# Patient Record
Sex: FEMALE | Race: OTHER | HISPANIC OR LATINO | URBAN - METROPOLITAN AREA
[De-identification: names, ages, dates, MRNs, and addresses within clinical notes are randomized per-mention and may not be internally consistent; named-entity substitution may affect disease eponyms.]

---

## 2019-11-10 ENCOUNTER — EMERGENCY (EMERGENCY)
Facility: HOSPITAL | Age: 69
LOS: 1 days | Discharge: HOME | End: 2019-11-10
Attending: EMERGENCY MEDICINE
Payer: SELF-PAY

## 2019-11-10 VITALS
RESPIRATION RATE: 20 BRPM | SYSTOLIC BLOOD PRESSURE: 132 MMHG | DIASTOLIC BLOOD PRESSURE: 62 MMHG | OXYGEN SATURATION: 100 % | HEART RATE: 66 BPM

## 2019-11-10 VITALS
HEART RATE: 71 BPM | SYSTOLIC BLOOD PRESSURE: 172 MMHG | TEMPERATURE: 98 F | HEIGHT: 63 IN | RESPIRATION RATE: 20 BRPM | WEIGHT: 139.99 LBS | DIASTOLIC BLOOD PRESSURE: 81 MMHG | OXYGEN SATURATION: 100 %

## 2019-11-10 PROCEDURE — 70450 CT HEAD/BRAIN W/O DYE: CPT

## 2019-11-10 PROCEDURE — 70450 CT HEAD/BRAIN W/O DYE: CPT | Mod: 26

## 2019-11-10 PROCEDURE — 90715 TDAP VACCINE 7 YRS/> IM: CPT

## 2019-11-10 PROCEDURE — 99284 EMERGENCY DEPT VISIT MOD MDM: CPT | Mod: 25

## 2019-11-10 PROCEDURE — 71046 X-RAY EXAM CHEST 2 VIEWS: CPT | Mod: 26

## 2019-11-10 PROCEDURE — 72170 X-RAY EXAM OF PELVIS: CPT | Mod: 26

## 2019-11-10 PROCEDURE — 99284 EMERGENCY DEPT VISIT MOD MDM: CPT

## 2019-11-10 PROCEDURE — 72125 CT NECK SPINE W/O DYE: CPT

## 2019-11-10 PROCEDURE — 71046 X-RAY EXAM CHEST 2 VIEWS: CPT

## 2019-11-10 PROCEDURE — 72125 CT NECK SPINE W/O DYE: CPT | Mod: 26

## 2019-11-10 PROCEDURE — 72170 X-RAY EXAM OF PELVIS: CPT

## 2019-11-10 PROCEDURE — 90471 IMMUNIZATION ADMIN: CPT

## 2019-11-10 RX ORDER — ACETAMINOPHEN 500 MG
975 TABLET ORAL ONCE
Refills: 0 | Status: COMPLETED | OUTPATIENT
Start: 2019-11-10 | End: 2019-11-10

## 2019-11-10 RX ORDER — TETANUS TOXOID, REDUCED DIPHTHERIA TOXOID AND ACELLULAR PERTUSSIS VACCINE, ADSORBED 5; 2.5; 8; 8; 2.5 [IU]/.5ML; [IU]/.5ML; UG/.5ML; UG/.5ML; UG/.5ML
0.5 SUSPENSION INTRAMUSCULAR ONCE
Refills: 0 | Status: COMPLETED | OUTPATIENT
Start: 2019-11-10 | End: 2019-11-10

## 2019-11-10 RX ADMIN — TETANUS TOXOID, REDUCED DIPHTHERIA TOXOID AND ACELLULAR PERTUSSIS VACCINE, ADSORBED 0.5 MILLILITER(S): 5; 2.5; 8; 8; 2.5 SUSPENSION INTRAMUSCULAR at 17:20

## 2019-11-10 RX ADMIN — Medication 975 MILLIGRAM(S): at 17:19

## 2019-11-10 NOTE — ED PROVIDER NOTE - PHYSICAL EXAMINATION
Abrasion to right occipital scalp.  No midline cervical tenderness, no spinal TTP.  Right sided lumbar TTP.

## 2019-11-10 NOTE — ED PROVIDER NOTE - CLINICAL SUMMARY MEDICAL DECISION MAKING FREE TEXT BOX
69 y/o female presents s/p witnessed mechanical fall. No blood thinner use. Will check CT head and C-spine, CXR, X-ray of pelvis, provide pain control, update tetanus, and likely discharge.

## 2019-11-10 NOTE — ED ADULT TRIAGE NOTE - CHIEF COMPLAINT QUOTE
Head injury s/p fall x 3.08 pm while stepping onto Escalator. Fell basckwards. No loc. Head injury s/p fall x 3.08 pm while stepping onto Escalator. Fell backwards. No loc.

## 2019-11-10 NOTE — ED PROVIDER NOTE - OBJECTIVE STATEMENT
69 y/o female with PMHx of HTN and DM presents to the ED s/p witnessed mechanical fall today. Pt states that she was getting on an escalator when she slipped and fell backwards, hitting the back of her head and lower back. Pt denies any LOC and was ambulatory after the fall. Pt denies any chest pain, SOB, difficulty ambulating, difficulty urinating, blood thinner use, or other complaints. Pt now c/o pain to the posterior portion of her head and right lower back. Last tetanus update unknown. 69 y/o female with PMHx of HTN and DM presents to the ED s/p witnessed mechanical fall today. Pt states that she was getting on an escalator when she slipped and fell backwards, hitting the back of her head and lower back. Pt denies any LOC and was ambulatory after the fall. Pt denies any chest pain, SOB, difficulty ambulating, difficulty urinating, blood thinner use, or other complaints. Pt now c/o pain to the posterior portion of her head and right lower back. Last tetanus update unknown. Pt is Citizen of [place] speaking and declined a , opted instead for family at bedside to translate.

## 2019-11-10 NOTE — ED ADULT NURSE NOTE - NSIMPLEMENTINTERV_GEN_ALL_ED
Implemented All Fall Risk Interventions:  [place] to call system. Call bell, personal items and telephone within reach. Instruct patient to call for assistance. Room bathroom lighting operational. Non-slip footwear when patient is off stretcher. Physically safe environment: no spills, clutter or unnecessary equipment. Stretcher in lowest position, wheels locked, appropriate side rails in place. Provide visual cue, wrist band, yellow gown, etc. Monitor gait and stability. Monitor for mental status changes and reorient to person, place, and time. Review medications for side effects contributing to fall risk. Reinforce activity limits and safety measures with patient and family.

## 2019-11-10 NOTE — ED ADULT NURSE NOTE - CHPI ED NUR SYMPTOMS NEG
no blurred vision/no vomiting/no change in level of consciousness/no syncope/no loss of consciousness/no nausea

## 2019-11-10 NOTE — ED PROVIDER NOTE - ATTENDING CONTRIBUTION TO CARE
Patient with mechanical fall due to misstep onto an escalator. hit head. on exam 5mm nongaping laceration on occipital scalp. no loss of consciousness. no need to suture. no other bony tenderness to palpation. awake alert NAD. tetanus updated. home with primary care physician followup.

## 2019-11-10 NOTE — ED PROVIDER NOTE - PATIENT PORTAL LINK FT
You can access the FollowMyHealth Patient Portal offered by [facility] by registering at the following website: [email or web address]. By joining [employer]’s FollowMyHealth portal, you will also be able to view your health information using other applications (apps) compatible with our system.

## 2021-03-09 NOTE — ED PROVIDER NOTE - ATTESTATION, MLM
INFORMED PATITO (OR CHARGE RN) AND TAMIKO ( [facility] SURGICAL COORDINATOR) AND NICKEL AND LATEX ALLERGY ON 3-9-21  
I have reviewed and confirmed nurses' notes for patient's medications, allergies, medical history, and surgical history.

## 2023-10-10 NOTE — ED PROVIDER NOTE - CHPI ED SYMPTOMS NEG
no loss of consciousness/no chest pain, no SOB, no difficulty ambulating, no difficulty urinating
Statement Selected
104